# Patient Record
Sex: FEMALE | Race: WHITE | NOT HISPANIC OR LATINO | Employment: FULL TIME | URBAN - METROPOLITAN AREA
[De-identification: names, ages, dates, MRNs, and addresses within clinical notes are randomized per-mention and may not be internally consistent; named-entity substitution may affect disease eponyms.]

---

## 2022-04-28 ENCOUNTER — LAB (OUTPATIENT)
Dept: LAB | Facility: HOSPITAL | Age: 27
End: 2022-04-28

## 2022-04-28 ENCOUNTER — OFFICE VISIT (OUTPATIENT)
Dept: NEUROLOGY | Facility: CLINIC | Age: 27
End: 2022-04-28

## 2022-04-28 VITALS
HEIGHT: 72 IN | WEIGHT: 221 LBS | BODY MASS INDEX: 29.93 KG/M2 | HEART RATE: 102 BPM | OXYGEN SATURATION: 98 % | TEMPERATURE: 96.9 F | DIASTOLIC BLOOD PRESSURE: 78 MMHG | SYSTOLIC BLOOD PRESSURE: 130 MMHG

## 2022-04-28 DIAGNOSIS — R00.2 PALPITATIONS: ICD-10-CM

## 2022-04-28 DIAGNOSIS — R42 DIZZINESS: ICD-10-CM

## 2022-04-28 DIAGNOSIS — R42 DIZZINESS: Primary | ICD-10-CM

## 2022-04-28 PROBLEM — A18.01 POTT DISEASE: Status: ACTIVE | Noted: 2022-04-28

## 2022-04-28 PROBLEM — E66.01 MORBID OBESITY DUE TO EXCESS CALORIES: Status: ACTIVE | Noted: 2017-04-13

## 2022-04-28 PROBLEM — Z72.0 TOBACCO ABUSE: Status: ACTIVE | Noted: 2019-05-17

## 2022-04-28 PROBLEM — I10 ESSENTIAL HYPERTENSION: Status: ACTIVE | Noted: 2017-04-13

## 2022-04-28 LAB
BASOPHILS # BLD AUTO: 0.06 10*3/MM3 (ref 0–0.2)
BASOPHILS NFR BLD AUTO: 0.5 % (ref 0–1.5)
DEPRECATED RDW RBC AUTO: 42.1 FL (ref 37–54)
EOSINOPHIL # BLD AUTO: 0.26 10*3/MM3 (ref 0–0.4)
EOSINOPHIL NFR BLD AUTO: 2 % (ref 0.3–6.2)
ERYTHROCYTE [DISTWIDTH] IN BLOOD BY AUTOMATED COUNT: 12.9 % (ref 12.3–15.4)
HBA1C MFR BLD: 5.2 % (ref 4.8–5.6)
HCT VFR BLD AUTO: 45 % (ref 34–46.6)
HGB BLD-MCNC: 14.6 G/DL (ref 12–15.9)
IMM GRANULOCYTES # BLD AUTO: 0.04 10*3/MM3 (ref 0–0.05)
IMM GRANULOCYTES NFR BLD AUTO: 0.3 % (ref 0–0.5)
LYMPHOCYTES # BLD AUTO: 3.81 10*3/MM3 (ref 0.7–3.1)
LYMPHOCYTES NFR BLD AUTO: 28.8 % (ref 19.6–45.3)
MCH RBC QN AUTO: 28.9 PG (ref 26.6–33)
MCHC RBC AUTO-ENTMCNC: 32.4 G/DL (ref 31.5–35.7)
MCV RBC AUTO: 88.9 FL (ref 79–97)
MONOCYTES # BLD AUTO: 0.72 10*3/MM3 (ref 0.1–0.9)
MONOCYTES NFR BLD AUTO: 5.5 % (ref 5–12)
NEUTROPHILS NFR BLD AUTO: 62.9 % (ref 42.7–76)
NEUTROPHILS NFR BLD AUTO: 8.32 10*3/MM3 (ref 1.7–7)
NRBC BLD AUTO-RTO: 0 /100 WBC (ref 0–0.2)
PLATELET # BLD AUTO: 345 10*3/MM3 (ref 140–450)
PMV BLD AUTO: 9.1 FL (ref 6–12)
RBC # BLD AUTO: 5.06 10*6/MM3 (ref 3.77–5.28)
T4 SERPL-MCNC: 9.14 MCG/DL (ref 4.5–11.7)
TSH SERPL DL<=0.05 MIU/L-ACNC: 1.76 UIU/ML (ref 0.27–4.2)
VIT B12 BLD-MCNC: 459 PG/ML (ref 211–946)
WBC NRBC COR # BLD: 13.21 10*3/MM3 (ref 3.4–10.8)

## 2022-04-28 PROCEDURE — 36415 COLL VENOUS BLD VENIPUNCTURE: CPT

## 2022-04-28 PROCEDURE — 80050 GENERAL HEALTH PANEL: CPT

## 2022-04-28 PROCEDURE — 83036 HEMOGLOBIN GLYCOSYLATED A1C: CPT

## 2022-04-28 PROCEDURE — 84436 ASSAY OF TOTAL THYROXINE: CPT

## 2022-04-28 PROCEDURE — 82607 VITAMIN B-12: CPT | Performed by: NURSE PRACTITIONER

## 2022-04-28 PROCEDURE — 99204 OFFICE O/P NEW MOD 45 MIN: CPT | Performed by: NURSE PRACTITIONER

## 2022-04-28 RX ORDER — PROPRANOLOL HYDROCHLORIDE 20 MG/1
20 TABLET ORAL 2 TIMES DAILY
Qty: 60 TABLET | Refills: 5 | Status: SHIPPED | OUTPATIENT
Start: 2022-04-28

## 2022-04-28 NOTE — PROGRESS NOTES
Headache New Office Visit      Encounter Date: 2022   Patient Name: Tiffanie Mclaughlin  : 1995   MRN: [id number]   Self referral  Chief Complaint:    Chief Complaint   Patient presents with   • Migraine     NP- dizziness, nausea, vomitting, daily HA's, vision has gotten worse       History of Present Illness: Tiffanie Mclaughlin is a 26 y.o. female who is here today for evaluation of headaches, dizziness.    Dizziness  Feels like she is drunk, light-headed, and floating. Eyes twitch. Vision is blurred.  Has nausea. Symptoms last 10-15 minutes. No room spinning. Right ear ringing.  Not a whooshing sound. No decreased hearing. Symptoms resolve spontaneously.  Episodes occur twice a month. Saw optometrist and new glasses did improve her symptoms to reduce the frequency of the episodes. No chest pain. Did feel sob w tachy palpitations. Cold cloth helped. Has anxiety and belives some of her symptoms could be panic attacks.    Admits to unusual smell prior to dizzy episodes. No history of seizures. No tongue biting. Occasional numbness of right hand.    Headache Symptoms:   Onset at age 17.    Days per month: daily  Location: Holocranial radiation to post neck     Quality: Throbbing        Duration: minutes to hours  Severity: 6/10  Triggers: none  Associated Symptoms: Nausea, Vomiting, Photophobia, Phonophobia, Dizziness and  Vision changes clear spots in vision and blurred  Aura: vision changes    Abortives: sumatriptan-not effective, IBU  Preventives:    CT Head Without Contrast (2020 20:47)-nml    Tachycardia on fit bit w HR to 150.                                                                             Problem History  2021 [facility] for vertigo. Tx'd w steroids  Anxiety-previously on atarax prn. Sertraline-zombie. Buspar caused palpitations.  ALJEO-no treatment.  Subjective      Past Medical History:   Past Medical History:   Diagnosis Date   • Dizziness    • Migraine    • Pott disease 2022        Past Surgical History:   Past Surgical History:   Procedure Laterality Date   • TOE SURGERY             Family History:   Family History   Problem Relation Age of Onset   • Neuropathy Paternal Grandfather        Social History:   Social History     Socioeconomic History   • Marital status: Single   Tobacco Use   • Smoking status: Current Every Day Smoker     Packs/day: 1.00   • Smokeless tobacco: Never Used   Vaping Use   • Vaping Use: Never used   Substance and Sexual Activity   • Alcohol use: Never   • Drug use: Never   • Sexual activity: Defer       Medications:     Current Outpatient Medications:   •  propranolol (INDERAL) 20 MG tablet, Take 1 tablet by mouth 2 (Two) Times a Day., Disp: 60 tablet, Rfl: 5    Allergies:   Allergies   Allergen Reactions   • Prednisone Other (See Comments)     Light headed, shaky and can't speak   • Doxycycline Monohydrate Rash     Vibramycin       PHQ-9 Total Score:     STEADI Fall Risk Assessment has not been completed.    Objective     Physical Exam:   Physical Exam  Neurological:      Coordination: Finger-Nose-Finger Test abnormal.      Deep Tendon Reflexes:      Reflex Scores:       Bicep reflexes are 2+ on the right side and 2+ on the left side.       Patellar reflexes are 1+ on the right side and 1+ on the left side.       Achilles reflexes are 2+ on the right side and 2+ on the left side.        Neurologic Exam     Sensory Exam   Right arm light touch: decreased from fingers  Left arm light touch: normal  Right leg light touch: decreased from knee  Left leg light touch: normal    Gait, Coordination, and Reflexes     Coordination   Finger to nose coordination: abnormal    Reflexes   Right biceps: 2+  Left biceps: 2+  Right patellar: 1+  Left patellar: 1+  Right achilles: 2+  Left achilles: 2+  Right : 2+  Left : 2+       Vital Signs:   Vitals:    04/28/22 1358   BP: 130/78   Pulse: 102   Temp: 96.9 °F (36.1 °C)   SpO2: 98%   Weight: 100 kg (221 lb)   Height:  "182.9 cm (72\")     Body mass index is 29.97 kg/m².         Assessment / Plan      Assessment/Plan:   Diagnoses and all orders for this visit:    1. Dizziness (Primary)  -     MRI Brain With & Without Contrast; Future  -     Holter Monitor - 24 Hour; Future  -     CBC Auto Differential; Future  -     Comprehensive Metabolic Panel; Future  -     T4; Future  -     TSH; Future  -     Vitamin B12  -     Hemoglobin A1c; Future  -     propranolol (INDERAL) 20 MG tablet; Take 1 tablet by mouth 2 (Two) Times a Day.  Dispense: 60 tablet; Refill: 5    2. Palpitations  -     Holter Monitor - 24 Hour; Future  -     TSH; Future  -     propranolol (INDERAL) 20 MG tablet; Take 1 tablet by mouth 2 (Two) Times a Day.  Dispense: 60 tablet; Refill: 5         Patient Education:   I have discussed with the patient today the causes and overview of headaches. We discussed the different types of headaches to include tension-type headaches, Migraine headaches and Cluster headaches. We also discussed when headaches could or would be of a more serious condition such as brain infection, inflammation or bleeding within or around the brain. When to seek immediate medical attention or call 911.     Reviewed medications, potential side effects and signs and symptoms to report. Discussed risk versus benefits of treatment plan with patient and/or family-including medications, labs and radiology that may be ordered. Addressed questions and concerns during visit. Patient and/or family verbalized understanding and agree with plan. Instructed to call the office with any questions and report to ER with any life-threatening symptoms.     Follow Up:   Return in about 8 weeks (around 6/23/2022) for Recheck.    During this visit the following were done:  Labs Reviewed [x]    Labs Ordered [x]    Radiology Reports Reviewed []    Radiology Ordered [x]    PCP Records Reviewed []    Referring Provider Records Reviewed []    ER Records Reviewed []    Hospital " Records Reviewed []    History Obtained From Family []    Radiology Images Reviewed []    Other Reviewed [x]    Records Requested []      Harper Mcgregor, ELEAZAR, APRN

## 2022-04-29 ENCOUNTER — TELEPHONE (OUTPATIENT)
Dept: NEUROLOGY | Facility: CLINIC | Age: 27
End: 2022-04-29

## 2022-04-29 LAB
ALBUMIN SERPL-MCNC: 4.4 G/DL (ref 3.5–5.2)
ALBUMIN/GLOB SERPL: 1.3 G/DL
ALP SERPL-CCNC: 87 U/L (ref 39–117)
ALT SERPL W P-5'-P-CCNC: 35 U/L (ref 1–33)
ANION GAP SERPL CALCULATED.3IONS-SCNC: 12.3 MMOL/L (ref 5–15)
AST SERPL-CCNC: 22 U/L (ref 1–32)
BILIRUB SERPL-MCNC: 0.3 MG/DL (ref 0–1.2)
BUN SERPL-MCNC: 14 MG/DL (ref 6–20)
BUN/CREAT SERPL: 15.2 (ref 7–25)
CALCIUM SPEC-SCNC: 9.6 MG/DL (ref 8.6–10.5)
CHLORIDE SERPL-SCNC: 103 MMOL/L (ref 98–107)
CO2 SERPL-SCNC: 23.7 MMOL/L (ref 22–29)
CREAT SERPL-MCNC: 0.92 MG/DL (ref 0.57–1)
EGFRCR SERPLBLD CKD-EPI 2021: 89.4 ML/MIN/1.73
GLOBULIN UR ELPH-MCNC: 3.4 GM/DL
GLUCOSE SERPL-MCNC: 84 MG/DL (ref 65–99)
POTASSIUM SERPL-SCNC: 4.7 MMOL/L (ref 3.5–5.2)
PROT SERPL-MCNC: 7.8 G/DL (ref 6–8.5)
SODIUM SERPL-SCNC: 139 MMOL/L (ref 136–145)

## 2022-04-29 NOTE — TELEPHONE ENCOUNTER
Called patient and gave results.  Patient was understanding.      ----- Message from Harper Mcgregor DNP, APRN sent at 4/29/2022  6:00 AM EDT -----  Notify pt labs are stable.

## 2022-05-05 ENCOUNTER — PATIENT ROUNDING (BHMG ONLY) (OUTPATIENT)
Dept: NEUROLOGY | Facility: CLINIC | Age: 27
End: 2022-05-05

## 2022-05-05 NOTE — PROGRESS NOTES
May 5, 2022    Hello, may I speak with Tiffanie Mclaughlin?    My name is juana     I am  with [facility]  [address]   [place] [address] [phone].    Before we get started may I verify your date of birth? 1995    I am calling to officially welcome you to our practice and ask about your recent visit. Is this a good time to talk?     Patient Rounding sent through [employer].

## 2022-05-16 ENCOUNTER — TELEPHONE (OUTPATIENT)
Dept: NEUROLOGY | Facility: CLINIC | Age: 27
End: 2022-05-16

## 2022-05-26 ENCOUNTER — HOSPITAL ENCOUNTER (OUTPATIENT)
Dept: MRI IMAGING | Facility: HOSPITAL | Age: 27
Discharge: HOME | End: 2022-05-26
Admitting: NURSE PRACTITIONER

## 2022-05-26 DIAGNOSIS — R42 DIZZINESS: ICD-10-CM

## 2022-05-26 LAB — CREAT BLDA-MCNC: 0.8 MG/DL (ref 0.6–1.3)

## 2022-05-26 PROCEDURE — 0 GADOBENATE DIMEGLUMINE 529 MG/ML SOLUTION: Performed by: NURSE PRACTITIONER

## 2022-05-26 PROCEDURE — 70553 MRI BRAIN STEM W/O & W/DYE: CPT

## 2022-05-26 PROCEDURE — 82565 ASSAY OF CREATININE: CPT

## 2022-05-26 PROCEDURE — A9577 INJ MULTIHANCE: HCPCS | Performed by: NURSE PRACTITIONER

## 2022-05-26 RX ADMIN — GADOBENATE DIMEGLUMINE 20 ML: 529 INJECTION, SOLUTION INTRAVENOUS at 14:43

## 2023-09-05 DIAGNOSIS — R42 DIZZINESS: ICD-10-CM

## 2023-09-05 DIAGNOSIS — R00.2 PALPITATIONS: ICD-10-CM

## 2023-09-05 RX ORDER — PROPRANOLOL HYDROCHLORIDE 20 MG/1
20 TABLET ORAL 2 TIMES DAILY
Qty: 60 TABLET | Refills: 5 | OUTPATIENT
Start: 2023-09-05

## 2023-09-05 NOTE — TELEPHONE ENCOUNTER
Rx Refill Note  Requested Prescriptions     Pending Prescriptions Disp Refills    propranolol (INDERAL) 20 MG tablet 60 tablet 5     Sig: Take 1 tablet by mouth 2 (Two) Times a Day.      Last filled: 4/28/22 60 with 5 refills.  Last office visit with prescribing clinician: 4/28/2022      Next office visit with prescribing clinician: Visit date not found     Patient needs appointment for further refills.    Vonnie Nelson MA  09/05/23, 08:56 EDT